# Patient Record
(demographics unavailable — no encounter records)

---

## 2024-11-06 NOTE — PHYSICAL EXAM
[Normal Appearance] : normal appearance [Edema] : no peripheral edema [] : no respiratory distress [Bowel Sounds] : normal bowel sounds [Abdomen Tenderness] : non-tender [de-identified] : IPP in place. No pump in scrotum

## 2024-11-06 NOTE — ASSESSMENT
[FreeTextEntry1] : 83 y.o. M with a history of prostate cancer status post radiation approximate 2 years ago, reportedly with rising PSA, now status post ADT.  Patient on apalutamide -No records available during visit. Medical release form signed during visit today to be sent to Dr. Francisco's office, for PET scan results - Schedule telehealth visit in 2 to 3 weeks once all records are available to review next steps - Discussed with patient that if he has metastatic prostate cancer, would likely refer to medical oncology for ongoing treatment and evaluation

## 2024-11-06 NOTE — HISTORY OF PRESENT ILLNESS
[FreeTextEntry1] : VIPIN CAO is a 83 year M who presents today as a new patient evaluation for prostate cancer.  History obtained from patient - no records   Previously followed by Dr Francisco in [place]. Reportedly PSA was up to 28. He then underwent radiation (45 sessions) and ADT ~2 years ago.  He then experienced ED and underwent IPP ~2 months ago by Dr Gonzalez in [place]. He reports his "tube" is not hooked up and he "always has an erection."  He was recently told his PSA was rising. He was given another injection (ADT) and had a PET scan 2-3 weeks ago. He presents today to establish care for a second opinion. He is on apalutamide  He is also on coumadin for "thick blood"

## 2024-12-12 NOTE — HISTORY OF PRESENT ILLNESS
[FreeTextEntry1] : This telephonic visit was provided via audio only technology. The patient, was located at [facility] at the time of the visit.  The provider, Earl Sorenson, was located at [place], [place] at the time of the visit. The patient and Provider participated in the telephonic visit.  Verbal consent for telephonic services was given on 12/12/2024 by the patient.   The patient-doctor relationship has been established in a face-to-face fashion via real time video/audio HIPAA compliant communication using telemedicine software. The patient's identity has been confirmed. The patient was previously emailed a copy of the telemedicine consent. They have had a chance to review and has now given verbal consent and has requested care to be assessed and treated via telemedicine. They understand there may be limitations in this process, and that they may need further followup care in the office and/or hospital settings.   83-year-old male presents for follow-up of prostate cancer  To review, he was diagnosed with prostate cancer 4/2021 (bernardo 4+3=7 PCa). He then underwent XRT and 2 years of ADT (May 2021 - May 2023)  He was then noted to have rising PSA to 26.8  He then underwent PS[facility] PET scan ([place] [facility]) which demonstrated multiple bony metastases  He was started on apalutamide, Lupron by Dr. Francisco and recommended f/u with a medical oncologist He has since transitioned his care to [facility]

## 2024-12-12 NOTE — HISTORY OF PRESENT ILLNESS
[FreeTextEntry1] : This telephonic visit was provided via audio only technology. The patient, was located at [facility] at the time of the visit.  The provider, Earl Sorenson, was located at [place], [place] at the time of the visit. The patient and Provider participated in the telephonic visit.  Verbal consent for telephonic services was given on 12/12/2024 by the patient.   The patient-doctor relationship has been established in a face-to-face fashion via real time video/audio HIPAA compliant communication using telemedicine software. The patient's identity has been confirmed. The patient was previously emailed a copy of the telemedicine consent. They have had a chance to review and has now given verbal consent and has requested care to be assessed and treated via telemedicine. They understand there may be limitations in this process, and that they may need further followup care in the office and/or hospital settings.   83-year-old male presents for follow-up of prostate cancer  To review, he was diagnosed with prostate cancer 4/2021 (bernardo 4+3=7 PCa). He then underwent XRT and 2 years of ADT (May 2021 - May 2023)  He was then noted to have rising PSA to 26.8  He then underwent PSMA PET scan ([place] [facility]) which demonstrated multiple bony metastases  He was started on apalutamide, Lupron by Dr. Francisco and recommended f/u with a medical oncologist He has since transitioned his care to [facility]

## 2024-12-12 NOTE — ASSESSMENT
[FreeTextEntry1] : 83 y.o. M with metastatic prostate cancer - Records reviewed, history of prostate cancer status post radiation, ADT, now with bony mets - On lupron, apalutamide - Has transitioned his care to [facility]. Did offer to refer to [facility] oncology for ongoing oncologic needs - he declines and is happy with his care at [facility]. He will let me know in the future if he has additional urologic concerns  Telehealth Consultation: 10 minutes - 5 minutes reviewing his history and discussing prior results. 5 minutes discussing various treatment options and writing his note.

## 2024-12-12 NOTE — ASSESSMENT
[FreeTextEntry1] : 83 y.o. M with metastatic prostate cancer - Records reviewed, history of prostate cancer status post radiation, ADT, now with bony mets - On lupron, apalutamide - Has transitioned his care to [facility]. Did offer to refer to [facility] oncology for ongoing oncologic needs - he declines and is happy with his care at [facility]. He will let me know in the future if he has additional urologic concerns  Telehealth Consultation: 10 minutes - 5 minutes reviewing his history and discussing prior results. 5 minutes discussing various treatment options and writing his note.

## 2024-12-12 NOTE — HISTORY OF PRESENT ILLNESS
[FreeTextEntry1] : This telephonic visit was provided via audio only technology. The patient, was located at [facility] at the time of the visit.  The provider, Earl Sorenson, was located at [place], [place] at the time of the visit. The patient and Provider participated in the telephonic visit.  Verbal consent for telephonic services was given on 12/12/2024 by the patient.   The patient-doctor relationship has been established in a face-to-face fashion via real time video/audio HIPAA compliant communication using telemedicine software. The patient's identity has been confirmed. The patient was previously emailed a copy of the telemedicine consent. They have had a chance to review and has now given verbal consent and has requested care to be assessed and treated via telemedicine. They understand there may be limitations in this process, and that they may need further followup care in the office and/or hospital settings.   83-year-old male presents for follow-up of prostate cancer  To review, he was diagnosed with prostate cancer 4/2021 (bernardo 4+3=7 PCa). He then underwent XRT and 2 years of ADT (May 2021 - May 2023)  He was then noted to have rising PSA to 26.8  He then underwent PSMA PET scan ([place] [facility]) which demonstrated multiple bony metastases  He was started on apalutamide, Lupron by Dr. Francisco and recommended f/u with a medical oncologist He has since transitioned his care to [facility]

## 2024-12-12 NOTE — ASSESSMENT
[FreeTextEntry1] : 83 y.o. M with metastatic prostate cancer - Records reviewed, history of prostate cancer status post radiation, ADT, now with bony mets - On lupron, apalutamide - Has transitioned his care to [facility]. Did offer to refer to [facility] oncology for ongoing oncologic needs - he declines and is happy with his care at [facility]. He will let me know in the future if he has additional urologic concerns  Telehealth Consultation: 10 minutes - 5 minutes reviewing his history and discussing prior results. 5 minutes discussing various treatment options and writing his note.